# Patient Record
(demographics unavailable — no encounter records)

---

## 2024-11-22 NOTE — REVIEW OF SYSTEMS
[Eyesight Problems] : eyesight problems [Negative] : Heme/Lymph [Fever] : no fever [Chills] : no chills [Feeling Tired] : not feeling tired [Chest Pain] : no chest pain [Palpitations] : no palpitations [Lower Ext Edema] : no lower extremity edema [Dysuria] : no dysuria [Itching] : no itching [FreeTextEntry2] : Fluctuates, between 140 and 150 [FreeTextEntry3] : B/l cataract [FreeTextEntry5] : Recent normal stress test [FreeTextEntry7] : No nausea. No hematochezia. Colonoscopy 1/2024, had polyps removed, GI, no report.  [FreeTextEntry9] : chronic knee pain

## 2024-11-22 NOTE — ASSESSMENT
[FreeTextEntry1] : 69 yo overweight Female (BMI 27) with Hx of Type 2 DM (Dx ~ 2006, on metformin), HTN, HLD, was referred by Dr. Ortiz for elevated liver enzymes and monoclonal gammopathy. She was referred initially to Dr. Ortiz (hematology) for high MCV.  She had BM biopsy, and reportedly she did not have myeloma.   # Hx of elevated liver enzymes # Monoclonal gammopathy # Metabolic risk factors - On exam no stigmata of CLD, and in brought in records, one occasion of borderline AST (33, ULN 32), and normal liver morphology. - Now normal Fibroscan and normalized liver enzymes and function. - Liver workup was unremarkable.  - Will need to monitor as remains at risk for MASLD, important the modification of risk factors  # Pancreatic cyst and pancreatic duct dilation. - Mgmt. per advanced GI.  - Given that her GI (Dr. Mcdonald) is relocating, will order the recommended MRCP for 1s week of 5/2025 - Advised patient to schedule GI f/u  # Prolonged APTT - F/u w/ Dr. Ortiz  # Elevated CPK - Rheum ref.   RTC  after the MRCP 5/2025

## 2024-11-22 NOTE — PHYSICAL EXAM
[Non-Tender] : non-tender [General Appearance - Alert] : alert [Sclera] : the sclera and conjunctiva were normal [General Appearance - In No Acute Distress] : in no acute distress [Oropharynx] : the oropharynx was normal [Neck Appearance] : the appearance of the neck was normal [Jugular Venous Distention Increased] : there was no jugular-venous distention [Auscultation Breath Sounds / Voice Sounds] : lungs were clear to auscultation bilaterally [Heart Rate And Rhythm] : heart rate was normal and rhythm regular [Heart Sounds] : normal S1 and S2 [Heart Sounds Gallop] : no gallops [Murmurs] : no murmurs [Heart Sounds Pericardial Friction Rub] : no pericardial rub [Edema] : there was no peripheral edema [Bowel Sounds] : normal bowel sounds [Abdomen Soft] : soft [Abdomen Tenderness] : non-tender [] : no hepato-splenomegaly [Abdomen Mass (___ Cm)] : no abdominal mass palpated [Cervical Lymph Nodes Enlarged Posterior Bilaterally] : posterior cervical [Cervical Lymph Nodes Enlarged Anterior Bilaterally] : anterior cervical [Supraclavicular Lymph Nodes Enlarged Bilaterally] : supraclavicular [Axillary Lymph Nodes Enlarged Bilaterally] : axillary [Femoral Lymph Nodes Enlarged Bilaterally] : femoral [Inguinal Lymph Nodes Enlarged Bilaterally] : inguinal [No CVA Tenderness] : no ~M costovertebral angle tenderness [No Spinal Tenderness] : no spinal tenderness [Abnormal Walk] : normal gait [Skin Color & Pigmentation] : normal skin color and pigmentation [Oriented To Time, Place, And Person] : oriented to person, place, and time [Impaired Insight] : insight and judgment were intact [Affect] : the affect was normal [Scleral Icterus] : No Scleral Icterus [Spider Angioma] : No spider angioma(s) were observed [Abdominal  Ascites] : no ascites [Asterixis] : no asterixis observed [Jaundice] : No jaundice [Palmar Erythema] : no Palmar Erythema [FreeTextEntry1] : Grossly intact

## 2024-11-22 NOTE — REASON FOR VISIT
Check psa blood test in the lab  Call [phone] to schedule CT scan  Schedule cystoscopy   
[Follow-Up: _____] : a [unfilled] follow-up visit

## 2024-11-22 NOTE — HISTORY OF PRESENT ILLNESS
[Needlestick Exposure] : no needlestick exposure [Infected Sexual Partner] : no infected sexual partner [IV Drug Use] : no IV drug use [Tattoo] : no tattoos [Body Piercing] : no body piercing [Hemodialysis] : no hemodialysis [Transfusion before 1992] : no transfusion before 1992 [Transplant before 1992] : no transplant before 1992 [Alcohol Abuse] : no alcohol abuse [Autoimmune Disorder] : no autoimmune disorder [Household Contact to HBV] : no household contact to HBV [Travel to Endemic Area] : no travel to an endemic area [Occupational Exposure] : no occupational exposure [Cocaine Use] : no cocaine use [de-identified] : 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 Ortiz for elevated liver enzymes and monoclonal gammopathy. She was referred initially to Dr. Ortiz (hematology) for high MCV.  She had BM biopsy, and reportedly she did not have myeloma.   She brought in records from Dr. Ortiz`s office. Labs 5/15/24: Hep A IgM, Hep C ab neg, HBsAg neg, HBcAb IgM neg, HBsAb neg, , B2M 1.7, WBC 5.4, Hb 14.3, , BUN 8.2, Cr 0.93, , K 4.2, alb 4.5, bili 0.3, AST 33, ALT 32, ALP 83, ferritin 42.3, folate >20, kappa light chain 15.1, lambda light chain 16.7, ratio 0.9, haptoglobin 149, HBcAb total neg, HIV neg, iron sat 31%, , , PTHintact 31, B12 633, M spike 11%. Labs 6/5/24: WBC 5.5, Hb 15.1,  Labs 7/31/24: HBA1c 6.43, vit D 67, alb 4.5, AST 29, ALT 28, , bili 0.4, , K 4.2, Cr 0.9, WBC 5, Hb 15.9, , , [id number],  PET CT 8/3/24 showed normal liver morphology, no fat or iron, no focal lesion, biliary tree normal. Noted mild pancreatic duct dilation, measuring up to 4.3 mm, and a 8x7 mm pancreatic cyst near the level of ampulla.  PET CT 5/31/24 showed no active myelomatous disease no FDG avid LN, focal activity in ascending colon w/o apparent CT correlation.  DEXA 9/13/22 showed osteopenia L hip and L spine.  MRI  LLE and RLE  6/27/24: degenerative arthropathy of L hip, no suspicious bony lesion.  BM  6/21/24: Normale female karyotype.   She is here for follow up to discuss results. Liver enzymes and function were normal in the 9/27/24 blood work, along with normal fibroscan and normal HELLER Fibrosure.  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  Noted mild CPK elevation, which can be related to her excessive workout. She was off statin for about 3 months (since about 8/2024), and recently resumed b/o rising LDL.  Also noted mild CA 19-9 elevation, but EUS 11/8/24 showed no mass, repeat MRCP in 6 months was recommended. She reported feeling well.

## 2025-04-22 NOTE — HISTORY OF PRESENT ILLNESS
[FreeTextEntry1] : 70F with pmhx of hysterectomy c/b SBO, dilated pancreatic duct, pancreatic cysts, DM, HTN, HLD presenting for follow-up. Pt had prior EUS with pd head cystic dilation up to 5 mm. Recommended for surveillance. Pt reports last month had episode of recurrent SBO, hospitalized at [facility] and treated conservatively, attributed to adhesions from prior surgeries. Feels well now. Denies any other complaints, no abd pain, wt loss, n/v/d/c, melena, hematochezia, fever/chills, jaundice, dark urine, or other issues.

## 2025-04-22 NOTE — PHYSICAL EXAM
[Alert] : alert [Normal Voice/Communication] : normal voice/communication [Healthy Appearing] : healthy appearing [No Acute Distress] : no acute distress [Sclera] : the sclera and conjunctiva were normal [Hearing Threshold Finger Rub Not Grainger] : hearing was normal [Normal Lips/Gums] : the lips and gums were normal [Oropharynx] : the oropharynx was normal [Normal Appearance] : the appearance of the neck was normal [No Neck Mass] : no neck mass was observed [No Respiratory Distress] : no respiratory distress [No Acc Muscle Use] : no accessory muscle use [Respiration, Rhythm And Depth] : normal respiratory rhythm and effort [Auscultation Breath Sounds / Voice Sounds] : lungs were clear to auscultation bilaterally [Heart Rate And Rhythm] : heart rate was normal and rhythm regular [Normal S1, S2] : normal S1 and S2 [Murmurs] : no murmurs [Bowel Sounds] : normal bowel sounds [Abdomen Tenderness] : non-tender [No Masses] : no abdominal mass palpated [Abdomen Soft] : soft [] : no hepatosplenomegaly [Oriented To Time, Place, And Person] : oriented to person, place, and time

## 2025-04-22 NOTE — ASSESSMENT
[FreeTextEntry1] : 70F with pmhx of hysterectomy c/b SBO, dilated pancreatic duct, pancreatic cysts, DM, HTN, HLD presenting for follow-up. Pt had prior EUS with pd head cystic dilation up to 5 mm. Recommended for surveillance. Pt reports last month had episode of recurrent SBO, hospitalized at [facility] and treated conservatively, attributed to adhesions from prior surgeries. Feels well now.  - Obtain repeat MRI for surveillance.  - RTC after MR.  Total time spent to complete patient's assessment, review medical chart including labs & personal review of prior imaging and available endoscopy records, counseling and discussion of plan of care was more than 30 minutes.